# Patient Record
Sex: FEMALE | Race: WHITE | NOT HISPANIC OR LATINO
[De-identification: names, ages, dates, MRNs, and addresses within clinical notes are randomized per-mention and may not be internally consistent; named-entity substitution may affect disease eponyms.]

---

## 2017-12-19 ENCOUNTER — APPOINTMENT (OUTPATIENT)
Dept: GASTROENTEROLOGY | Facility: CLINIC | Age: 37
End: 2017-12-19
Payer: COMMERCIAL

## 2017-12-19 VITALS
SYSTOLIC BLOOD PRESSURE: 100 MMHG | WEIGHT: 127 LBS | BODY MASS INDEX: 20.41 KG/M2 | HEIGHT: 66 IN | DIASTOLIC BLOOD PRESSURE: 70 MMHG | TEMPERATURE: 98 F

## 2017-12-19 DIAGNOSIS — Z78.9 OTHER SPECIFIED HEALTH STATUS: ICD-10-CM

## 2017-12-19 DIAGNOSIS — Z87.19 PERSONAL HISTORY OF OTHER DISEASES OF THE DIGESTIVE SYSTEM: ICD-10-CM

## 2017-12-19 DIAGNOSIS — F15.90 OTHER STIMULANT USE, UNSPECIFIED, UNCOMPLICATED: ICD-10-CM

## 2017-12-19 DIAGNOSIS — Z83.79 FAMILY HISTORY OF OTHER DISEASES OF THE DIGESTIVE SYSTEM: ICD-10-CM

## 2017-12-19 PROCEDURE — 99244 OFF/OP CNSLTJ NEW/EST MOD 40: CPT

## 2017-12-19 RX ORDER — MULTIVITAMIN
TABLET ORAL DAILY
Refills: 0 | Status: ACTIVE | COMMUNITY

## 2018-06-28 ENCOUNTER — APPOINTMENT (OUTPATIENT)
Dept: OBGYN | Facility: CLINIC | Age: 38
End: 2018-06-28
Payer: COMMERCIAL

## 2018-06-28 VITALS
HEIGHT: 66 IN | SYSTOLIC BLOOD PRESSURE: 110 MMHG | WEIGHT: 127 LBS | DIASTOLIC BLOOD PRESSURE: 70 MMHG | BODY MASS INDEX: 20.41 KG/M2

## 2018-06-28 DIAGNOSIS — Z87.898 PERSONAL HISTORY OF OTHER SPECIFIED CONDITIONS: ICD-10-CM

## 2018-06-28 PROCEDURE — 99385 PREV VISIT NEW AGE 18-39: CPT

## 2018-06-29 LAB — HPV HIGH+LOW RISK DNA PNL CVX: NOT DETECTED

## 2018-07-02 LAB — CYTOLOGY CVX/VAG DOC THIN PREP: NORMAL

## 2018-08-24 ENCOUNTER — APPOINTMENT (OUTPATIENT)
Dept: INTERNAL MEDICINE | Facility: CLINIC | Age: 38
End: 2018-08-24
Payer: COMMERCIAL

## 2018-08-24 VITALS
DIASTOLIC BLOOD PRESSURE: 66 MMHG | OXYGEN SATURATION: 98 % | HEART RATE: 77 BPM | HEIGHT: 66 IN | WEIGHT: 131 LBS | SYSTOLIC BLOOD PRESSURE: 100 MMHG | BODY MASS INDEX: 21.05 KG/M2

## 2018-08-24 DIAGNOSIS — Z83.49 FAMILY HISTORY OF OTHER ENDOCRINE, NUTRITIONAL AND METABOLIC DISEASES: ICD-10-CM

## 2018-08-24 DIAGNOSIS — Z80.0 FAMILY HISTORY OF MALIGNANT NEOPLASM OF DIGESTIVE ORGANS: ICD-10-CM

## 2018-08-24 DIAGNOSIS — Z01.419 ENCOUNTER FOR GYNECOLOGICAL EXAMINATION (GENERAL) (ROUTINE) W/OUT ABNORMAL FINDINGS: ICD-10-CM

## 2018-08-24 DIAGNOSIS — Z83.71 FAMILY HISTORY OF COLONIC POLYPS: ICD-10-CM

## 2018-08-24 DIAGNOSIS — Z87.891 PERSONAL HISTORY OF NICOTINE DEPENDENCE: ICD-10-CM

## 2018-08-24 DIAGNOSIS — K62.89 OTHER SPECIFIED DISEASES OF ANUS AND RECTUM: ICD-10-CM

## 2018-08-24 DIAGNOSIS — Z87.42 PERSONAL HISTORY OF OTHER DISEASES OF THE FEMALE GENITAL TRACT: ICD-10-CM

## 2018-08-24 DIAGNOSIS — Z87.19 PERSONAL HISTORY OF OTHER DISEASES OF THE DIGESTIVE SYSTEM: ICD-10-CM

## 2018-08-24 DIAGNOSIS — Z83.79 FAMILY HISTORY OF OTHER DISEASES OF THE DIGESTIVE SYSTEM: ICD-10-CM

## 2018-08-24 PROCEDURE — 99395 PREV VISIT EST AGE 18-39: CPT | Mod: 25

## 2018-08-24 PROCEDURE — 90471 IMMUNIZATION ADMIN: CPT

## 2018-08-24 PROCEDURE — 90715 TDAP VACCINE 7 YRS/> IM: CPT

## 2018-08-24 NOTE — PAST MEDICAL HISTORY
[Menstruating] : menstruating [Normal Amount/Duration] : it was of a normal amount and duration [Regular Cycle Intervals] : have been regular [Total Preg ___] : G[unfilled] [Full Term ___] : Full Term: [unfilled]

## 2018-09-11 ENCOUNTER — TRANSCRIPTION ENCOUNTER (OUTPATIENT)
Age: 38
End: 2018-09-11

## 2018-09-14 LAB
25(OH)D3 SERPL-MCNC: 38.8 NG/ML
ALBUMIN SERPL ELPH-MCNC: 4.6 G/DL
ALP BLD-CCNC: 39 U/L
ALT SERPL-CCNC: 18 U/L
ANION GAP SERPL CALC-SCNC: 14 MMOL/L
AST SERPL-CCNC: 18 U/L
BASOPHILS # BLD AUTO: 0.01 K/UL
BASOPHILS NFR BLD AUTO: 0.3 %
BILIRUB SERPL-MCNC: 0.8 MG/DL
BUN SERPL-MCNC: 14 MG/DL
CALCIUM SERPL-MCNC: 9.4 MG/DL
CHLORIDE SERPL-SCNC: 103 MMOL/L
CHOLEST SERPL-MCNC: 184 MG/DL
CHOLEST/HDLC SERPL: 2.4 RATIO
CO2 SERPL-SCNC: 24 MMOL/L
CREAT SERPL-MCNC: 0.72 MG/DL
EOSINOPHIL # BLD AUTO: 0.02 K/UL
EOSINOPHIL NFR BLD AUTO: 0.6 %
GLUCOSE SERPL-MCNC: 84 MG/DL
HCT VFR BLD CALC: 39.4 %
HDLC SERPL-MCNC: 78 MG/DL
HGB BLD-MCNC: 12.3 G/DL
IMM GRANULOCYTES NFR BLD AUTO: 0 %
LDLC SERPL CALC-MCNC: 91 MG/DL
LYMPHOCYTES # BLD AUTO: 1.18 K/UL
LYMPHOCYTES NFR BLD AUTO: 33.1 %
MAN DIFF?: NORMAL
MCHC RBC-ENTMCNC: 29.3 PG
MCHC RBC-ENTMCNC: 31.2 GM/DL
MCV RBC AUTO: 93.8 FL
MONOCYTES # BLD AUTO: 0.29 K/UL
MONOCYTES NFR BLD AUTO: 8.1 %
NEUTROPHILS # BLD AUTO: 2.06 K/UL
NEUTROPHILS NFR BLD AUTO: 57.9 %
PLATELET # BLD AUTO: 199 K/UL
POTASSIUM SERPL-SCNC: 4.6 MMOL/L
PROT SERPL-MCNC: 7.9 G/DL
RBC # BLD: 4.2 M/UL
RBC # FLD: 13.8 %
SODIUM SERPL-SCNC: 141 MMOL/L
TRIGL SERPL-MCNC: 74 MG/DL
TSH SERPL-ACNC: 2.28 UIU/ML
WBC # FLD AUTO: 3.56 K/UL

## 2019-02-04 NOTE — ADDENDUM
[FreeTextEntry1] : 9/14/18:  Reviewed labs, nl except WBC 3.5 low, would repeat in few months, other labs nl - discussed with pt.\par 2/1/19:  Repeat CBC nl - LM to call back to discuss.\par 2/4/19:  Discussed above with pt.

## 2019-02-04 NOTE — ASSESSMENT
[FreeTextEntry1] : 39 yo female with h/o as above including alopecia areata here as a new patient to establish care.\par 1.  Endo - check TSH given fam hx and personal hx. alopecia areata\par 2.  GI - consider earlier colonoscopy than age 52 given fam hx colon polyps\par 3.  Gyn - pap utd, advised to discuss with gyn other contraception options\par 4.  HCM - check below labs; tdap today\par 5. RTO prn or 1year

## 2019-02-04 NOTE — PHYSICAL EXAM
[No Acute Distress] : no acute distress [Well Nourished] : well nourished [Well Developed] : well developed [Well-Appearing] : well-appearing [PERRL] : pupils equal round and reactive to light [Normal Oropharynx] : the oropharynx was normal [Normal TMs] : both tympanic membranes were normal [Normal Nasal Mucosa] : the nasal mucosa was normal [Supple] : supple [No Lymphadenopathy] : no lymphadenopathy [Thyroid Normal, No Nodules] : the thyroid was normal and there were no nodules present [No Respiratory Distress] : no respiratory distress  [Clear to Auscultation] : lungs were clear to auscultation bilaterally [No Accessory Muscle Use] : no accessory muscle use [Normal Rate] : normal rate  [Regular Rhythm] : with a regular rhythm [Normal S1, S2] : normal S1 and S2 [No Murmur] : no murmur heard [No Carotid Bruits] : no carotid bruits [Pedal Pulses Present] : the pedal pulses are present [No Edema] : there was no peripheral edema [Normal Appearance] : normal in appearance [No Nipple Discharge] : no nipple discharge [No Axillary Lymphadenopathy] : no axillary lymphadenopathy [Soft] : abdomen soft [Non Tender] : non-tender [Non-distended] : non-distended [No Masses] : no abdominal mass palpated [No HSM] : no HSM [Normal Bowel Sounds] : normal bowel sounds [Normal Supraclavicular Nodes] : no supraclavicular lymphadenopathy [Normal Axillary Nodes] : no axillary lymphadenopathy [Normal Posterior Cervical Nodes] : no posterior cervical lymphadenopathy [Normal Anterior Cervical Nodes] : no anterior cervical lymphadenopathy [Normal Inguinal Nodes] : no inguinal lymphadenopathy [No Joint Swelling] : no joint swelling [No Rash] : no rash [Normal Gait] : normal gait [Normal Affect] : the affect was normal [de-identified] : dense breast tissue

## 2019-02-04 NOTE — HISTORY OF PRESENT ILLNESS
[FreeTextEntry1] : establish care [de-identified] : 37 yo female with h/o as below here for CPE as a new patient.  Previous PCP Dr. Hairston in [facility] but seeing various doctors.  \par Did have alopecia, saw derm, also was dent in head, now better, got cortisone shots, wanted to make sure that there wasn't anything else going on.    \par Was also seeing breast specialist, saw spot right breast and had sonograms and said looks like nothing concerning, being followed.\par No other active issues.

## 2019-02-04 NOTE — REVIEW OF SYSTEMS
[Easy Bruising] : easy bruising [Negative] : Integumentary [Fever] : no fever [Chills] : no chills [Fatigue] : no fatigue [Recent Change In Weight] : ~T no recent weight change [Chest Pain] : no chest pain [Palpitations] : no palpitations [Shortness Of Breath] : no shortness of breath [Cough] : no cough [Abdominal Pain] : no abdominal pain [Nausea] : no nausea [Constipation] : no constipation [Diarrhea] : diarrhea [Vomiting] : no vomiting [Heartburn] : no heartburn [Melena] : no melena [Dysuria] : no dysuria [Vaginal Discharge] : no vaginal discharge [Dizziness] : no dizziness [Fainting] : no fainting [Anxiety] : no anxiety [Depression] : no depression [Easy Bleeding] : no easy bleeding [Swollen Glands] : no swollen glands [FreeTextEntry5] : palpitations with anxiety, rare [de-identified] : see hpi [de-identified] : was anemic during pregnancy; always bruised easily

## 2019-02-04 NOTE — HEALTH RISK ASSESSMENT
[0] : 2) Feeling down, depressed, or hopeless: Not at all (0) [Patient reported PAP Smear was normal] : Patient reported PAP Smear was normal [MammogramComments] : will go, never had [PapSmearDate] : 06/18 [ColonoscopyComments] : never had

## 2019-05-08 ENCOUNTER — TRANSCRIPTION ENCOUNTER (OUTPATIENT)
Age: 39
End: 2019-05-08

## 2019-06-14 ENCOUNTER — APPOINTMENT (OUTPATIENT)
Dept: INTERNAL MEDICINE | Facility: CLINIC | Age: 39
End: 2019-06-14
Payer: COMMERCIAL

## 2019-06-14 VITALS
WEIGHT: 128 LBS | HEART RATE: 70 BPM | BODY MASS INDEX: 20.57 KG/M2 | HEIGHT: 66 IN | DIASTOLIC BLOOD PRESSURE: 70 MMHG | OXYGEN SATURATION: 99 % | SYSTOLIC BLOOD PRESSURE: 100 MMHG

## 2019-06-14 DIAGNOSIS — D72.819 DECREASED WHITE BLOOD CELL COUNT, UNSPECIFIED: ICD-10-CM

## 2019-06-14 DIAGNOSIS — Z87.440 PERSONAL HISTORY OF URINARY (TRACT) INFECTIONS: ICD-10-CM

## 2019-06-14 PROCEDURE — 99395 PREV VISIT EST AGE 18-39: CPT

## 2019-06-14 RX ORDER — CIPROFLOXACIN HYDROCHLORIDE 250 MG/1
250 TABLET, FILM COATED ORAL
Qty: 6 | Refills: 0 | Status: DISCONTINUED | COMMUNITY
Start: 2018-09-18 | End: 2019-06-14

## 2019-06-14 RX ORDER — BIOTIN 10 MG
TABLET ORAL
Refills: 0 | Status: ACTIVE | COMMUNITY

## 2019-07-15 LAB
25(OH)D3 SERPL-MCNC: 42.1 NG/ML
ALBUMIN SERPL ELPH-MCNC: 4.9 G/DL
ALP BLD-CCNC: 34 U/L
ALT SERPL-CCNC: 11 U/L
ANION GAP SERPL CALC-SCNC: 13 MMOL/L
AST SERPL-CCNC: 15 U/L
BASOPHILS # BLD AUTO: 0.02 K/UL
BASOPHILS NFR BLD AUTO: 0.5 %
BILIRUB SERPL-MCNC: 1.4 MG/DL
BUN SERPL-MCNC: 14 MG/DL
CALCIUM SERPL-MCNC: 9.5 MG/DL
CHLORIDE SERPL-SCNC: 103 MMOL/L
CO2 SERPL-SCNC: 24 MMOL/L
CREAT SERPL-MCNC: 0.76 MG/DL
EOSINOPHIL # BLD AUTO: 0.03 K/UL
EOSINOPHIL NFR BLD AUTO: 0.8 %
GLUCOSE SERPL-MCNC: 91 MG/DL
HCT VFR BLD CALC: 37.3 %
HGB BLD-MCNC: 11.8 G/DL
IMM GRANULOCYTES NFR BLD AUTO: 0 %
LYMPHOCYTES # BLD AUTO: 1.32 K/UL
LYMPHOCYTES NFR BLD AUTO: 36.3 %
MAN DIFF?: NORMAL
MCHC RBC-ENTMCNC: 29.4 PG
MCHC RBC-ENTMCNC: 31.6 GM/DL
MCV RBC AUTO: 92.8 FL
MONOCYTES # BLD AUTO: 0.45 K/UL
MONOCYTES NFR BLD AUTO: 12.4 %
NEUTROPHILS # BLD AUTO: 1.82 K/UL
NEUTROPHILS NFR BLD AUTO: 50 %
PLATELET # BLD AUTO: 148 K/UL
POTASSIUM SERPL-SCNC: 4.8 MMOL/L
PROT SERPL-MCNC: 7.5 G/DL
RBC # BLD: 4.02 M/UL
RBC # FLD: 13.2 %
SODIUM SERPL-SCNC: 140 MMOL/L
TSH SERPL-ACNC: 2.72 UIU/ML
WBC # FLD AUTO: 3.64 K/UL

## 2019-07-15 NOTE — ADDENDUM
[FreeTextEntry1] : 7/3/19:  Reviewed labs, slightly low WBC and platelets (would repeat, was low previously), bilirubin 1.4 slightly elevated (would repeat), other labs nl, thyroid sono showing signs of Hashimoto's but nl size and no nodules - LM to call back to discuss.\par 7/15/19:  Reviewed labs with pt, will repeat CBC and bilirubin in 6 months, consider yearly TFT testing given thyroid sono results.

## 2019-07-15 NOTE — HISTORY OF PRESENT ILLNESS
[FreeTextEntry1] : physical [de-identified] : 40 yo female with h/o as below here for CPE.\par Feeling well overall, no complaints.\par Occasionally when goes to bathroom will have a little bit of blood with wiping, blood not coating the stool, had hemorrhoids in the past, had ?sigmoidoscopy in the past and everything was nl.\par Fell down stairs yesterday, fell down 4 steps, bruised buttock and arms, has chronic easy bruising, bleeding not excessive.  Didn't hit head.\par No other active issues.

## 2019-07-15 NOTE — REVIEW OF SYSTEMS
[Easy Bruising] : easy bruising [Negative] : Musculoskeletal [Fever] : no fever [Chills] : no chills [Fatigue] : no fatigue [Recent Change In Weight] : ~T no recent weight change [Chest Pain] : no chest pain [Palpitations] : no palpitations [Shortness Of Breath] : no shortness of breath [Cough] : no cough [Abdominal Pain] : no abdominal pain [Nausea] : no nausea [Constipation] : no constipation [Diarrhea] : diarrhea [Vomiting] : no vomiting [Heartburn] : no heartburn [Melena] : no melena [Dysuria] : no dysuria [Vaginal Discharge] : no vaginal discharge [Skin Rash] : no skin rash [Dizziness] : no dizziness [Fainting] : no fainting [Anxiety] : no anxiety [Depression] : no depression [Easy Bleeding] : no easy bleeding [Swollen Glands] : no swollen glands [FreeTextEntry7] : see hpi

## 2019-07-15 NOTE — HEALTH RISK ASSESSMENT
[0] : 2) Feeling down, depressed, or hopeless: Not at all (0) [Patient reported mammogram was normal] : Patient reported mammogram was normal [Patient reported PAP Smear was normal] : Patient reported PAP Smear was normal [MammogramDate] : 11/18 [MammogramComments] : ?spot breast, followed by ultrasound and breast specialist [PapSmearDate] : 06/19 [ColonoscopyComments] : not yet

## 2019-07-15 NOTE — ASSESSMENT
[FreeTextEntry1] : 40 yo female with h/o as above including alopecia areata here for CPE.\par 1.  Endo - ?thyromegaly or mild nodularity, check TSH and thyroid sono; check vitamin D, had nl lipids previously\par 2.  GI - hemorrhoid likely cause of slight blood with BM, will observe, advised stool softeners/fiber\par 3.  Gyn - pap utd\par 4.  HCM- check below labs; vaccines utd\par 5.  RTO prn or 1 year

## 2019-07-15 NOTE — PHYSICAL EXAM
[No Acute Distress] : no acute distress [Well Nourished] : well nourished [Well Developed] : well developed [Well-Appearing] : well-appearing [PERRL] : pupils equal round and reactive to light [Normal Oropharynx] : the oropharynx was normal [Normal TMs] : both tympanic membranes were normal [Normal Nasal Mucosa] : the nasal mucosa was normal [Supple] : supple [No Lymphadenopathy] : no lymphadenopathy [No Respiratory Distress] : no respiratory distress  [Clear to Auscultation] : lungs were clear to auscultation bilaterally [No Accessory Muscle Use] : no accessory muscle use [Normal Rate] : normal rate  [Regular Rhythm] : with a regular rhythm [Normal S1, S2] : normal S1 and S2 [No Murmur] : no murmur heard [No Carotid Bruits] : no carotid bruits [Pedal Pulses Present] : the pedal pulses are present [No Edema] : there was no peripheral edema [Normal Appearance] : normal in appearance [No Nipple Discharge] : no nipple discharge [No Axillary Lymphadenopathy] : no axillary lymphadenopathy [Soft] : abdomen soft [Non Tender] : non-tender [Non-distended] : non-distended [No Masses] : no abdominal mass palpated [No HSM] : no HSM [Normal Bowel Sounds] : normal bowel sounds [Normal Supraclavicular Nodes] : no supraclavicular lymphadenopathy [Normal Axillary Nodes] : no axillary lymphadenopathy [Normal Posterior Cervical Nodes] : no posterior cervical lymphadenopathy [Normal Anterior Cervical Nodes] : no anterior cervical lymphadenopathy [Normal Inguinal Nodes] : no inguinal lymphadenopathy [No Joint Swelling] : no joint swelling [No Rash] : no rash [Normal Gait] : normal gait [Normal Affect] : the affect was normal [de-identified] : ?thyromegaly [FreeTextEntry1] : external hemorrhoid [de-identified] : few fleshy papules on back; superficial ecchymosis left buttock

## 2019-08-30 ENCOUNTER — APPOINTMENT (OUTPATIENT)
Dept: INTERNAL MEDICINE | Facility: CLINIC | Age: 39
End: 2019-08-30

## 2019-09-23 ENCOUNTER — TRANSCRIPTION ENCOUNTER (OUTPATIENT)
Age: 39
End: 2019-09-23

## 2019-09-24 ENCOUNTER — APPOINTMENT (OUTPATIENT)
Dept: CT IMAGING | Facility: CLINIC | Age: 39
End: 2019-09-24

## 2019-09-24 ENCOUNTER — APPOINTMENT (OUTPATIENT)
Dept: INTERNAL MEDICINE | Facility: CLINIC | Age: 39
End: 2019-09-24
Payer: COMMERCIAL

## 2019-09-24 VITALS
SYSTOLIC BLOOD PRESSURE: 110 MMHG | DIASTOLIC BLOOD PRESSURE: 70 MMHG | HEIGHT: 66 IN | BODY MASS INDEX: 20.73 KG/M2 | WEIGHT: 129 LBS | TEMPERATURE: 98.2 F | HEART RATE: 81 BPM | OXYGEN SATURATION: 99 %

## 2019-09-24 LAB
BILIRUB UR QL STRIP: NEGATIVE
CLARITY UR: CLEAR
GLUCOSE UR-MCNC: NEGATIVE
HCG UR QL: 0.2 EU/DL
HCG UR QL: NEGATIVE
HGB UR QL STRIP.AUTO: NEGATIVE
KETONES UR-MCNC: NEGATIVE
LEUKOCYTE ESTERASE UR QL STRIP: NEGATIVE
NITRITE UR QL STRIP: NEGATIVE
PH UR STRIP: 7
PROT UR STRIP-MCNC: NEGATIVE
QUALITY CONTROL: YES
SP GR UR STRIP: 1.01

## 2019-09-24 PROCEDURE — 81025 URINE PREGNANCY TEST: CPT

## 2019-09-24 PROCEDURE — 99214 OFFICE O/P EST MOD 30 MIN: CPT | Mod: 25

## 2019-09-24 PROCEDURE — 81003 URINALYSIS AUTO W/O SCOPE: CPT | Mod: QW

## 2019-09-25 ENCOUNTER — TRANSCRIPTION ENCOUNTER (OUTPATIENT)
Age: 39
End: 2019-09-25

## 2019-09-25 LAB
APPEARANCE: CLEAR
BACTERIA UR CULT: NORMAL
BACTERIA: NEGATIVE
BILIRUBIN URINE: NEGATIVE
BLOOD URINE: NEGATIVE
COLOR: COLORLESS
GLUCOSE QUALITATIVE U: NEGATIVE
HYALINE CASTS: 0 /LPF
KETONES URINE: NEGATIVE
LEUKOCYTE ESTERASE URINE: NEGATIVE
MICROSCOPIC-UA: NORMAL
NITRITE URINE: NEGATIVE
PH URINE: 7
PROTEIN URINE: NEGATIVE
RED BLOOD CELLS URINE: 1 /HPF
SPECIFIC GRAVITY URINE: 1.01
SQUAMOUS EPITHELIAL CELLS: 1 /HPF
UROBILINOGEN URINE: NORMAL
WHITE BLOOD CELLS URINE: 0 /HPF

## 2019-09-25 NOTE — REVIEW OF SYSTEMS
[Negative] : Heme/Lymph [FreeTextEntry2] : see hpi [FreeTextEntry7] : see hpi [FreeTextEntry9] : see hpi

## 2019-09-25 NOTE — PHYSICAL EXAM
[No Acute Distress] : no acute distress [Well Nourished] : well nourished [Well Developed] : well developed [Well-Appearing] : well-appearing [Supple] : supple [No Respiratory Distress] : no respiratory distress  [No Accessory Muscle Use] : no accessory muscle use [Clear to Auscultation] : lungs were clear to auscultation bilaterally [Normal Rate] : normal rate  [Regular Rhythm] : with a regular rhythm [Normal S1, S2] : normal S1 and S2 [No Murmur] : no murmur heard [Soft] : abdomen soft [No Masses] : no abdominal mass palpated [Non-distended] : non-distended [No HSM] : no HSM [Normal Bowel Sounds] : normal bowel sounds [No Spinal Tenderness] : no spinal tenderness [de-identified] : mild-moderate LLQ tenderness to palpation, no rebound, no guarding [de-identified] : +CVA tenderness b/l, no muscle spasms, no tenderness to palpation upper back or lower back (but notes discomfort medial to scapula and in sacroiliac area b/l)

## 2019-09-25 NOTE — ADDENDUM
[FreeTextEntry1] : 9/24/19:  Called pt re. prelim read on CT A/P and discussed with pt, no pyelonephritis or diverticulitis found, possible complex left ovarian cyst (advised pelvic sono - advised pt discuss with gyn), cavernous hemangioma liver (per radiologist doesn't require further imaging), no other abnl.  \par 9/25/19:  UA/UCx negative - messaged to pt.

## 2019-09-25 NOTE — ASSESSMENT
[FreeTextEntry1] : 40 yo female with h/o as above including alopecia areata here for 3 weeks of mid-back pain/flank pain, worsening over time, with nausea (waking her from sleep), episode of chills and with flank tenderness and LLQ tenderness.  Negative UA and upregnancy in office, will send out UCx to r/o occult infection, will send for CT A/P today to r/o pyelonephritis or diverticulitis.

## 2020-07-16 ENCOUNTER — TRANSCRIPTION ENCOUNTER (OUTPATIENT)
Age: 40
End: 2020-07-16

## 2020-09-29 NOTE — ADDENDUM
[FreeTextEntry1] : Called pt to discuss, having intermittent dry cough, advised mucinex, gave ventolin in case needed, advised proning, fluids.  Discussed quarantine measures for herself and her family.  Will continue to monitor.

## 2020-10-01 NOTE — HISTORY OF PRESENT ILLNESS
[FreeTextEntry1] : Called pt to check in, pt feeling better, cough is better, fatigue better, almost back to herself.  Approximately day 10 of symptoms.  Advised to continue to monitor and quarantine and call back if any worsening.

## 2020-11-20 ENCOUNTER — APPOINTMENT (OUTPATIENT)
Dept: INTERNAL MEDICINE | Facility: CLINIC | Age: 40
End: 2020-11-20
Payer: COMMERCIAL

## 2020-11-20 VITALS
OXYGEN SATURATION: 99 % | HEART RATE: 75 BPM | BODY MASS INDEX: 21.21 KG/M2 | DIASTOLIC BLOOD PRESSURE: 60 MMHG | WEIGHT: 132 LBS | SYSTOLIC BLOOD PRESSURE: 90 MMHG | HEIGHT: 66 IN

## 2020-11-20 DIAGNOSIS — M54.9 DORSALGIA, UNSPECIFIED: ICD-10-CM

## 2020-11-20 DIAGNOSIS — Z87.898 PERSONAL HISTORY OF OTHER SPECIFIED CONDITIONS: ICD-10-CM

## 2020-11-20 PROCEDURE — 90686 IIV4 VACC NO PRSV 0.5 ML IM: CPT

## 2020-11-20 PROCEDURE — G0008: CPT

## 2020-11-20 PROCEDURE — 99395 PREV VISIT EST AGE 18-39: CPT | Mod: 25

## 2020-11-20 RX ORDER — ALBUTEROL SULFATE 90 UG/1
108 (90 BASE) INHALANT RESPIRATORY (INHALATION)
Qty: 1 | Refills: 3 | Status: DISCONTINUED | COMMUNITY
Start: 2020-09-29 | End: 2020-11-20

## 2020-11-22 NOTE — PHYSICAL EXAM
[No Acute Distress] : no acute distress [Well Nourished] : well nourished [Well Developed] : well developed [Well-Appearing] : well-appearing [PERRL] : pupils equal round and reactive to light [Normal TMs] : both tympanic membranes were normal [No Lymphadenopathy] : no lymphadenopathy [Supple] : supple [Thyroid Normal, No Nodules] : the thyroid was normal and there were no nodules present [No Respiratory Distress] : no respiratory distress  [No Accessory Muscle Use] : no accessory muscle use [Clear to Auscultation] : lungs were clear to auscultation bilaterally [Normal Rate] : normal rate  [Regular Rhythm] : with a regular rhythm [Normal S1, S2] : normal S1 and S2 [No Murmur] : no murmur heard [No Carotid Bruits] : no carotid bruits [Pedal Pulses Present] : the pedal pulses are present [No Edema] : there was no peripheral edema [Soft] : abdomen soft [Non Tender] : non-tender [Non-distended] : non-distended [No Masses] : no abdominal mass palpated [No HSM] : no HSM [Normal Bowel Sounds] : normal bowel sounds [Normal Supraclavicular Nodes] : no supraclavicular lymphadenopathy [Normal Posterior Cervical Nodes] : no posterior cervical lymphadenopathy [Normal Anterior Cervical Nodes] : no anterior cervical lymphadenopathy [Normal Inguinal Nodes] : no inguinal lymphadenopathy [No Joint Swelling] : no joint swelling [No Rash] : no rash [Normal Gait] : normal gait [Normal Affect] : the affect was normal

## 2020-12-17 LAB
APPEARANCE: CLEAR
BACTERIA: NEGATIVE
BASOPHILS # BLD AUTO: 0.02 K/UL
BASOPHILS NFR BLD AUTO: 0.3 %
BILIRUBIN URINE: NEGATIVE
BLOOD URINE: NEGATIVE
COLOR: COLORLESS
EOSINOPHIL # BLD AUTO: 0.01 K/UL
EOSINOPHIL NFR BLD AUTO: 0.2 %
FERRITIN SERPL-MCNC: 17 NG/ML
GLUCOSE QUALITATIVE U: NEGATIVE
HCT VFR BLD CALC: 38.4 %
HGB BLD-MCNC: 12.4 G/DL
HYALINE CASTS: 1 /LPF
IMM GRANULOCYTES NFR BLD AUTO: 0.2 %
IRON SATN MFR SERPL: 36 %
IRON SERPL-MCNC: 126 UG/DL
KETONES URINE: NEGATIVE
LEUKOCYTE ESTERASE URINE: NEGATIVE
LYMPHOCYTES # BLD AUTO: 1.05 K/UL
LYMPHOCYTES NFR BLD AUTO: 18.1 %
MAN DIFF?: NORMAL
MCHC RBC-ENTMCNC: 30.6 PG
MCHC RBC-ENTMCNC: 32.3 GM/DL
MCV RBC AUTO: 94.8 FL
MICROSCOPIC-UA: NORMAL
MONOCYTES # BLD AUTO: 0.42 K/UL
MONOCYTES NFR BLD AUTO: 7.2 %
NEUTROPHILS # BLD AUTO: 4.3 K/UL
NEUTROPHILS NFR BLD AUTO: 74 %
NITRITE URINE: NEGATIVE
PH URINE: 6.5
PLATELET # BLD AUTO: 159 K/UL
PROTEIN URINE: NEGATIVE
RBC # BLD: 4.05 M/UL
RBC # FLD: 12.8 %
RED BLOOD CELLS URINE: 0 /HPF
SARS-COV-2 IGG SERPL IA-ACNC: 7.76 INDEX
SARS-COV-2 IGG SERPL QL IA: POSITIVE
SPECIFIC GRAVITY URINE: 1
SQUAMOUS EPITHELIAL CELLS: 1 /HPF
TIBC SERPL-MCNC: 352 UG/DL
TSH SERPL-ACNC: 2.89 UIU/ML
UIBC SERPL-MCNC: 226 UG/DL
UROBILINOGEN URINE: NORMAL
WBC # FLD AUTO: 5.81 K/UL
WHITE BLOOD CELLS URINE: 0 /HPF

## 2020-12-17 NOTE — ASSESSMENT
[FreeTextEntry1] : 41 yo female with h/o as above including alopecia areata here for CPE.\par 1.  Derm - hair loss different than her usual alopecia, check TSH and iron studies\par 2.  Heme - had slight low platelets, check cbc again\par 3.  Gyn - pap/mammo utd\par 4.  ID - recent covid, check ab and ua\par 5.  HCM - check below labs, other labs recently done/nl; flu shot today, other vaccines utd\par 6.  RTO prn or 1 year

## 2020-12-17 NOTE — REVIEW OF SYSTEMS
[Frequency] : frequency [Easy Bruising] : easy bruising [Negative] : Musculoskeletal [Fever] : no fever [Chills] : no chills [Fatigue] : no fatigue [Recent Change In Weight] : ~T no recent weight change [Chest Pain] : no chest pain [Palpitations] : no palpitations [Shortness Of Breath] : no shortness of breath [Cough] : no cough [Abdominal Pain] : no abdominal pain [Nausea] : no nausea [Constipation] : no constipation [Diarrhea] : diarrhea [Vomiting] : no vomiting [Heartburn] : no heartburn [Melena] : no melena [Dysuria] : no dysuria [Vaginal Discharge] : no vaginal discharge [Skin Rash] : no skin rash [Dizziness] : no dizziness [Fainting] : no fainting [Anxiety] : no anxiety [Depression] : no depression [Easy Bleeding] : no easy bleeding [Swollen Glands] : no swollen glands [FreeTextEntry2] : weight gain with covid/stress eating [FreeTextEntry8] : frequent urination [de-identified] : chronic easy bruising

## 2020-12-17 NOTE — HEALTH RISK ASSESSMENT
[0] : 2) Feeling down, depressed, or hopeless: Not at all (0) [MammogramComments] : gets mammo/breast sono [PapSmearComments] : utd [ColonoscopyComments] : hasn't had

## 2020-12-17 NOTE — HISTORY OF PRESENT ILLNESS
[FreeTextEntry1] : physical [de-identified] : 41 yo female with h/o as below here for CPE.\par Feeling well overall, no complaints.  Having a lot of hair loss.  No further symptoms from COVID few weeks ago, cough fully resolved.

## 2021-05-17 ENCOUNTER — NON-APPOINTMENT (OUTPATIENT)
Age: 41
End: 2021-05-17

## 2021-05-17 ENCOUNTER — APPOINTMENT (OUTPATIENT)
Dept: INTERNAL MEDICINE | Facility: CLINIC | Age: 41
End: 2021-05-17

## 2023-01-13 ENCOUNTER — APPOINTMENT (OUTPATIENT)
Dept: INTERNAL MEDICINE | Facility: CLINIC | Age: 43
End: 2023-01-13
Payer: COMMERCIAL

## 2023-01-13 VITALS
HEIGHT: 66 IN | BODY MASS INDEX: 20.25 KG/M2 | SYSTOLIC BLOOD PRESSURE: 120 MMHG | DIASTOLIC BLOOD PRESSURE: 80 MMHG | HEART RATE: 89 BPM | OXYGEN SATURATION: 98 % | WEIGHT: 126 LBS

## 2023-01-13 DIAGNOSIS — L63.9 ALOPECIA AREATA, UNSPECIFIED: ICD-10-CM

## 2023-01-13 DIAGNOSIS — Z86.16 PERSONAL HISTORY OF COVID-19: ICD-10-CM

## 2023-01-13 DIAGNOSIS — Z83.79 FAMILY HISTORY OF OTHER DISEASES OF THE DIGESTIVE SYSTEM: ICD-10-CM

## 2023-01-13 DIAGNOSIS — R20.2 PARESTHESIA OF SKIN: ICD-10-CM

## 2023-01-13 DIAGNOSIS — R94.6 ABNORMAL RESULTS OF THYROID FUNCTION STUDIES: ICD-10-CM

## 2023-01-13 DIAGNOSIS — L65.9 NONSCARRING HAIR LOSS, UNSPECIFIED: ICD-10-CM

## 2023-01-13 PROCEDURE — 99396 PREV VISIT EST AGE 40-64: CPT

## 2023-01-17 ENCOUNTER — TRANSCRIPTION ENCOUNTER (OUTPATIENT)
Age: 43
End: 2023-01-17

## 2023-01-17 LAB
ALBUMIN SERPL ELPH-MCNC: 4.7 G/DL
ALP BLD-CCNC: 39 U/L
ALT SERPL-CCNC: 10 U/L
ANION GAP SERPL CALC-SCNC: 9 MMOL/L
AST SERPL-CCNC: 12 U/L
BASOPHILS # BLD AUTO: 0.04 K/UL
BASOPHILS NFR BLD AUTO: 1 %
BILIRUB SERPL-MCNC: 0.8 MG/DL
BUN SERPL-MCNC: 12 MG/DL
CALCIUM SERPL-MCNC: 9.5 MG/DL
CHLORIDE SERPL-SCNC: 103 MMOL/L
CHOLEST SERPL-MCNC: 159 MG/DL
CO2 SERPL-SCNC: 27 MMOL/L
CREAT SERPL-MCNC: 0.76 MG/DL
EGFR: 102 ML/MIN/1.73M2
EOSINOPHIL # BLD AUTO: 0.03 K/UL
EOSINOPHIL NFR BLD AUTO: 0.8 %
FERRITIN SERPL-MCNC: 20 NG/ML
FOLATE SERPL-MCNC: >20 NG/ML
GLUCOSE SERPL-MCNC: 86 MG/DL
HCT VFR BLD CALC: 39 %
HDLC SERPL-MCNC: 67 MG/DL
HGB BLD-MCNC: 12.5 G/DL
IMM GRANULOCYTES NFR BLD AUTO: 0.3 %
IRON SATN MFR SERPL: 23 %
IRON SERPL-MCNC: 79 UG/DL
LDLC SERPL CALC-MCNC: 80 MG/DL
LYMPHOCYTES # BLD AUTO: 1.1 K/UL
LYMPHOCYTES NFR BLD AUTO: 28.7 %
MAN DIFF?: NORMAL
MCHC RBC-ENTMCNC: 30.1 PG
MCHC RBC-ENTMCNC: 32.1 GM/DL
MCV RBC AUTO: 94 FL
MONOCYTES # BLD AUTO: 0.36 K/UL
MONOCYTES NFR BLD AUTO: 9.4 %
NEUTROPHILS # BLD AUTO: 2.29 K/UL
NEUTROPHILS NFR BLD AUTO: 59.8 %
NONHDLC SERPL-MCNC: 92 MG/DL
PLATELET # BLD AUTO: 175 K/UL
POTASSIUM SERPL-SCNC: 4.6 MMOL/L
PROT SERPL-MCNC: 7.5 G/DL
RBC # BLD: 4.15 M/UL
RBC # FLD: 12.8 %
SODIUM SERPL-SCNC: 140 MMOL/L
TIBC SERPL-MCNC: 350 UG/DL
TRIGL SERPL-MCNC: 56 MG/DL
TSH SERPL-ACNC: 3.48 UIU/ML
UIBC SERPL-MCNC: 271 UG/DL
VIT B12 SERPL-MCNC: 999 PG/ML
WBC # FLD AUTO: 3.83 K/UL

## 2023-01-17 NOTE — REVIEW OF SYSTEMS
[Easy Bruising] : easy bruising [Negative] : Musculoskeletal [Fever] : no fever [Chills] : no chills [Fatigue] : no fatigue [Recent Change In Weight] : ~T no recent weight change [Chest Pain] : no chest pain [Palpitations] : no palpitations [Shortness Of Breath] : no shortness of breath [Cough] : no cough [Abdominal Pain] : no abdominal pain [Nausea] : no nausea [Constipation] : no constipation [Diarrhea] : diarrhea [Vomiting] : no vomiting [Heartburn] : no heartburn [Melena] : no melena [Dysuria] : no dysuria [Vaginal Discharge] : no vaginal discharge [Skin Rash] : no skin rash [Headache] : no headache [Dizziness] : no dizziness [Fainting] : no fainting [Anxiety] : no anxiety [Depression] : no depression [Easy Bleeding] : no easy bleeding [de-identified] : see hpi [de-identified] : see hpi [de-identified] : see hpi [de-identified] : chronic easy bruising

## 2023-01-17 NOTE — PHYSICAL EXAM
[No Acute Distress] : no acute distress [Well Nourished] : well nourished [Well Developed] : well developed [Well-Appearing] : well-appearing [PERRL] : pupils equal round and reactive to light [EOMI] : extraocular movements intact [Normal Oropharynx] : the oropharynx was normal [Normal TMs] : both tympanic membranes were normal [No Lymphadenopathy] : no lymphadenopathy [Supple] : supple [Thyroid Normal, No Nodules] : the thyroid was normal and there were no nodules present [No Respiratory Distress] : no respiratory distress  [No Accessory Muscle Use] : no accessory muscle use [Clear to Auscultation] : lungs were clear to auscultation bilaterally [Normal Rate] : normal rate  [Regular Rhythm] : with a regular rhythm [Normal S1, S2] : normal S1 and S2 [No Murmur] : no murmur heard [No Carotid Bruits] : no carotid bruits [Pedal Pulses Present] : the pedal pulses are present [No Edema] : there was no peripheral edema [Normal Appearance] : normal in appearance [No Nipple Discharge] : no nipple discharge [No Axillary Lymphadenopathy] : no axillary lymphadenopathy [Soft] : abdomen soft [Non Tender] : non-tender [Non-distended] : non-distended [No Masses] : no abdominal mass palpated [No HSM] : no HSM [Normal Bowel Sounds] : normal bowel sounds [Normal Supraclavicular Nodes] : no supraclavicular lymphadenopathy [Normal Axillary Nodes] : no axillary lymphadenopathy [Normal Posterior Cervical Nodes] : no posterior cervical lymphadenopathy [Normal Anterior Cervical Nodes] : no anterior cervical lymphadenopathy [Normal Inguinal Nodes] : no inguinal lymphadenopathy [No Joint Swelling] : no joint swelling [No Rash] : no rash [Normal Gait] : normal gait [Normal Affect] : the affect was normal [de-identified] : very dense and lumpy breast tissue b/l

## 2023-01-17 NOTE — ASSESSMENT
[FreeTextEntry1] : 41 yo female with h/o as above including alopecia areata here for CPE.\par 1.  Derm/hair - some increased hair loss, ?from stress, check tsh and iron studies, saw derm recently\par 2.  Psych - mild dysthymia likely due to life stress with daughter's condition, will try to find longitudinal behavioral health resources for pt\par 3.  Gyn - pap utd, mammo utd and follows with breast surgeon as well\par 4.  Neuro - some intermittent paresthesias in head ever since had covid vaccine, check vitamin b12/folate\par 5.  HCM - check below labs; suggested covid and flu vaccine but wants to defer given reaction to last covid vaccine (which flared again after had flu shot last year)\par 6.  RTO prn or 1 year

## 2023-01-17 NOTE — HEALTH RISK ASSESSMENT
[Patient reported mammogram was normal] : Patient reported mammogram was normal [Patient reported PAP Smear was normal] : Patient reported PAP Smear was normal [MammogramDate] : 07/22 [MammogramComments] : seeing breast specialist, gets mammo/breast sono [PapSmearDate] : 11/22 [PapSmearComments] : utd [ColonoscopyComments] : hasn't had

## 2023-01-17 NOTE — HISTORY OF PRESENT ILLNESS
[FreeTextEntry1] : physical [de-identified] : 43 yo female with h/o as below here for CPE.\par Daughter got diagnosed with ulcerative colitis, in and out of the hospital, going for iron transfusion today, age 9 now.  So a lot of stress, feels a little down due to the situation.\par Saw dermatologist recently.\par After had covid vaccine, 15 minutes after second shot, felt tingling/warming sensation in head, intermittent for 2 weeks in her head, then it went away, ever since had menses or ovulating would occur again, subsided over time, few weeks ago son had a URI, she got URI and then felt it again in head.  Not sure if due to some inflammatory process from covid vaccine.\par Had more hair loss.  Nl menses.\par No other active issues.

## 2023-02-03 ENCOUNTER — APPOINTMENT (OUTPATIENT)
Dept: INTERNAL MEDICINE | Facility: CLINIC | Age: 43
End: 2023-02-03
Payer: COMMERCIAL

## 2023-02-03 ENCOUNTER — NON-APPOINTMENT (OUTPATIENT)
Age: 43
End: 2023-02-03

## 2023-02-03 PROCEDURE — 90791 PSYCH DIAGNOSTIC EVALUATION: CPT | Mod: 95

## 2023-02-07 ENCOUNTER — APPOINTMENT (OUTPATIENT)
Dept: INTERNAL MEDICINE | Facility: CLINIC | Age: 43
End: 2023-02-07
Payer: COMMERCIAL

## 2023-02-07 PROCEDURE — 90834 PSYTX W PT 45 MINUTES: CPT | Mod: 95

## 2023-02-14 ENCOUNTER — TRANSCRIPTION ENCOUNTER (OUTPATIENT)
Age: 43
End: 2023-02-14

## 2023-02-28 ENCOUNTER — APPOINTMENT (OUTPATIENT)
Dept: INTERNAL MEDICINE | Facility: CLINIC | Age: 43
End: 2023-02-28
Payer: COMMERCIAL

## 2023-02-28 PROCEDURE — 90834 PSYTX W PT 45 MINUTES: CPT | Mod: 95

## 2023-03-09 ENCOUNTER — APPOINTMENT (OUTPATIENT)
Dept: INTERNAL MEDICINE | Facility: CLINIC | Age: 43
End: 2023-03-09
Payer: COMMERCIAL

## 2023-03-09 PROCEDURE — 99213 OFFICE O/P EST LOW 20 MIN: CPT | Mod: 95

## 2023-03-10 ENCOUNTER — APPOINTMENT (OUTPATIENT)
Dept: INTERNAL MEDICINE | Facility: CLINIC | Age: 43
End: 2023-03-10
Payer: COMMERCIAL

## 2023-03-10 PROCEDURE — 90834 PSYTX W PT 45 MINUTES: CPT | Mod: 95

## 2023-03-12 NOTE — PHYSICAL EXAM
[de-identified] : TEB. General: Well-developed well-nourished in no apparent distress. Appears mildly ill. \par Respiratory: Speaking in complete sentences, no respiratory distress noted.\par Neuro: No focal deficits noted.\par

## 2023-03-12 NOTE — ASSESSMENT
[FreeTextEntry1] : MARY JO SORIANO  is a 42 year old female  with history of  hair loss, paresthesia presented today for Rt big toe cuticle area pain and swelling. \par \par # Rt big toe cellulitis\par Sent Rx for Keflex 500mg qid for 7days. \par Explained that if abscess forms, she should see a provider in person for possible I & D. \par Patient was advised to call us for any worsening sx or if no resolution.

## 2023-03-12 NOTE — HISTORY OF PRESENT ILLNESS
[Home] : at home, [unfilled] , at the time of the visit. [Medical Office: ([facility])___] : at the medical office located in  [Verbal consent obtained from patient] : the patient, [unfilled] [FreeTextEntry8] : MARY JO SORIANO  is a 42 year old female  with history of  hair loss, paresthesia presented today for Rt big toe cuticle area pain and swelling. \par \par She has Rt big toe's cuticle swelling with throbbing severe pain for a few days.\par No hx ingrown toenail or fungal infection.\par Denied foot trauma or diabetes.\par She got pedicure about 3 weeks ago.\par She was wearing shoes  that pressed against toe last week for wedding.\par Denied fever, chills,CP, SOB, abdominal pain, n/v/c/d.

## 2023-03-17 ENCOUNTER — APPOINTMENT (OUTPATIENT)
Dept: INTERNAL MEDICINE | Facility: CLINIC | Age: 43
End: 2023-03-17
Payer: COMMERCIAL

## 2023-03-17 PROCEDURE — 90834 PSYTX W PT 45 MINUTES: CPT | Mod: 95

## 2023-03-24 ENCOUNTER — APPOINTMENT (OUTPATIENT)
Dept: INTERNAL MEDICINE | Facility: CLINIC | Age: 43
End: 2023-03-24
Payer: COMMERCIAL

## 2023-04-04 ENCOUNTER — APPOINTMENT (OUTPATIENT)
Dept: INTERNAL MEDICINE | Facility: CLINIC | Age: 43
End: 2023-04-04
Payer: COMMERCIAL

## 2023-04-04 PROCEDURE — 90834 PSYTX W PT 45 MINUTES: CPT | Mod: 95

## 2023-04-11 ENCOUNTER — APPOINTMENT (OUTPATIENT)
Dept: INTERNAL MEDICINE | Facility: CLINIC | Age: 43
End: 2023-04-11

## 2023-05-09 ENCOUNTER — APPOINTMENT (OUTPATIENT)
Dept: INTERNAL MEDICINE | Facility: CLINIC | Age: 43
End: 2023-05-09
Payer: COMMERCIAL

## 2023-05-09 PROCEDURE — 90834 PSYTX W PT 45 MINUTES: CPT | Mod: 95

## 2023-05-19 ENCOUNTER — APPOINTMENT (OUTPATIENT)
Dept: INTERNAL MEDICINE | Facility: CLINIC | Age: 43
End: 2023-05-19
Payer: COMMERCIAL

## 2023-05-19 PROCEDURE — 90834 PSYTX W PT 45 MINUTES: CPT | Mod: 95

## 2023-05-30 ENCOUNTER — APPOINTMENT (OUTPATIENT)
Dept: INTERNAL MEDICINE | Facility: CLINIC | Age: 43
End: 2023-05-30
Payer: COMMERCIAL

## 2023-05-30 PROCEDURE — 90834 PSYTX W PT 45 MINUTES: CPT | Mod: 95

## 2023-06-09 ENCOUNTER — APPOINTMENT (OUTPATIENT)
Dept: INTERNAL MEDICINE | Facility: CLINIC | Age: 43
End: 2023-06-09

## 2023-06-16 ENCOUNTER — APPOINTMENT (OUTPATIENT)
Dept: INTERNAL MEDICINE | Facility: CLINIC | Age: 43
End: 2023-06-16
Payer: COMMERCIAL

## 2023-06-16 PROCEDURE — 90834 PSYTX W PT 45 MINUTES: CPT | Mod: 95

## 2023-06-23 ENCOUNTER — APPOINTMENT (OUTPATIENT)
Dept: INTERNAL MEDICINE | Facility: CLINIC | Age: 43
End: 2023-06-23
Payer: COMMERCIAL

## 2023-06-23 PROCEDURE — 90834 PSYTX W PT 45 MINUTES: CPT | Mod: 95

## 2024-02-08 PROBLEM — L03.039 CELLULITIS, TOE: Status: RESOLVED | Noted: 2023-03-09 | Resolved: 2024-02-08

## 2024-02-09 ENCOUNTER — APPOINTMENT (OUTPATIENT)
Dept: CT IMAGING | Facility: IMAGING CENTER | Age: 44
End: 2024-02-09
Payer: COMMERCIAL

## 2024-02-09 ENCOUNTER — OUTPATIENT (OUTPATIENT)
Dept: OUTPATIENT SERVICES | Facility: HOSPITAL | Age: 44
LOS: 1 days | End: 2024-02-09
Payer: COMMERCIAL

## 2024-02-09 ENCOUNTER — APPOINTMENT (OUTPATIENT)
Dept: INTERNAL MEDICINE | Facility: CLINIC | Age: 44
End: 2024-02-09
Payer: COMMERCIAL

## 2024-02-09 ENCOUNTER — RESULT REVIEW (OUTPATIENT)
Age: 44
End: 2024-02-09

## 2024-02-09 VITALS
SYSTOLIC BLOOD PRESSURE: 100 MMHG | OXYGEN SATURATION: 99 % | WEIGHT: 130 LBS | DIASTOLIC BLOOD PRESSURE: 74 MMHG | HEART RATE: 77 BPM | HEIGHT: 66 IN | BODY MASS INDEX: 20.89 KG/M2

## 2024-02-09 DIAGNOSIS — R10.32 LEFT LOWER QUADRANT PAIN: ICD-10-CM

## 2024-02-09 DIAGNOSIS — I10 ESSENTIAL (PRIMARY) HYPERTENSION: ICD-10-CM

## 2024-02-09 DIAGNOSIS — L03.039 CELLULITIS OF UNSPECIFIED TOE: ICD-10-CM

## 2024-02-09 DIAGNOSIS — Z23 ENCOUNTER FOR IMMUNIZATION: ICD-10-CM

## 2024-02-09 DIAGNOSIS — Z00.00 ENCOUNTER FOR GENERAL ADULT MEDICAL EXAMINATION W/OUT ABNORMAL FINDINGS: ICD-10-CM

## 2024-02-09 DIAGNOSIS — L03.031 CELLULITIS OF RIGHT TOE: ICD-10-CM

## 2024-02-09 PROCEDURE — 90686 IIV4 VACC NO PRSV 0.5 ML IM: CPT

## 2024-02-09 PROCEDURE — G0463: CPT

## 2024-02-09 PROCEDURE — G0008: CPT

## 2024-02-09 PROCEDURE — 74177 CT ABD & PELVIS W/CONTRAST: CPT | Mod: 26

## 2024-02-09 PROCEDURE — 99396 PREV VISIT EST AGE 40-64: CPT

## 2024-02-09 PROCEDURE — 74177 CT ABD & PELVIS W/CONTRAST: CPT

## 2024-02-09 RX ORDER — CEPHALEXIN 500 MG/1
500 TABLET ORAL
Qty: 28 | Refills: 0 | Status: DISCONTINUED | COMMUNITY
Start: 2023-03-09 | End: 2024-02-09

## 2024-02-09 RX ORDER — PSYLLIUM HUSK 0.4 G
CAPSULE ORAL
Refills: 0 | Status: ACTIVE | COMMUNITY

## 2024-02-09 RX ORDER — GUAR GUM 1 G
TABLET,CHEWABLE ORAL
Refills: 0 | Status: DISCONTINUED | COMMUNITY
End: 2024-02-09

## 2024-02-22 DIAGNOSIS — R10.32 LEFT LOWER QUADRANT PAIN: ICD-10-CM

## 2024-02-22 DIAGNOSIS — Z23 ENCOUNTER FOR IMMUNIZATION: ICD-10-CM

## 2024-02-22 DIAGNOSIS — Z00.00 ENCOUNTER FOR GENERAL ADULT MEDICAL EXAMINATION WITHOUT ABNORMAL FINDINGS: ICD-10-CM

## 2024-04-29 DIAGNOSIS — R93.2 ABNORMAL FINDINGS ON DIAGNOSTIC IMAGING OF LIVER AND BILIARY TRACT: ICD-10-CM

## 2024-04-29 LAB
ALBUMIN SERPL ELPH-MCNC: 4.9 G/DL
ALP BLD-CCNC: 36 U/L
ALT SERPL-CCNC: 10 U/L
ANION GAP SERPL CALC-SCNC: 9 MMOL/L
AST SERPL-CCNC: 16 U/L
BILIRUB SERPL-MCNC: 1 MG/DL
BUN SERPL-MCNC: 14 MG/DL
CALCIUM SERPL-MCNC: 9.4 MG/DL
CHLORIDE SERPL-SCNC: 103 MMOL/L
CHOLEST SERPL-MCNC: 143 MG/DL
CO2 SERPL-SCNC: 27 MMOL/L
CREAT SERPL-MCNC: 0.72 MG/DL
EGFR: 108 ML/MIN/1.73M2
GLUCOSE SERPL-MCNC: 88 MG/DL
HCT VFR BLD CALC: 36.6 %
HDLC SERPL-MCNC: 63 MG/DL
HGB BLD-MCNC: 12.1 G/DL
IRON SERPL-MCNC: 62 UG/DL
LDLC SERPL CALC-MCNC: 66 MG/DL
MCHC RBC-ENTMCNC: 29.9 PG
MCHC RBC-ENTMCNC: 33.1 GM/DL
MCV RBC AUTO: 90.4 FL
NONHDLC SERPL-MCNC: 79 MG/DL
PLATELET # BLD AUTO: 164 K/UL
POTASSIUM SERPL-SCNC: 4.5 MMOL/L
PROT SERPL-MCNC: 7.5 G/DL
RBC # BLD: 4.05 M/UL
RBC # FLD: 13 %
SODIUM SERPL-SCNC: 139 MMOL/L
TRIGL SERPL-MCNC: 64 MG/DL
TSH SERPL-ACNC: 3.29 UIU/ML
WBC # FLD AUTO: 4.47 K/UL

## 2024-04-29 NOTE — ADDENDUM
[FreeTextEntry1] : 2/13/24:  Reviewed labs/imaging with pt, nl labs, CT showed no abnl as cause of pain, found 5.9 cm liver lesion (?hemangioma) and smaller liver lesions (consider MRI abdomen, will order) and left ovarian cystic foci and possible uterine fibroid, can f/up with gyn.

## 2024-04-29 NOTE — HISTORY OF PRESENT ILLNESS
[FreeTextEntry1] : physical [de-identified] : 42 yo female with h/o as below here for CPE. Feeling ok, stress in general (daughter has UC, on 2 biologics), but doing better. This past week had pain LLQ but just saw gyn and had sono and didn't see anything.  Back was hurting her last few days lower back and then LLQ, was nauseated and then ate and felt better.   Having more calcium lately.  Not constipation, maybe having more frequent BMs twice/day, changed eating in last month, eating more yogurt and beans and healthier foods.   Still gets tingling in head if high stress week at work, not sure if inflammatory response as occurred after covid vaccine.   No other active issues.  Went to dermatologist, gynecologist.  Has dark spot one nail.  Asking about seeing GI for early colonoscopy given fam hx colon polyps and colon cancer.  Also screened positive for one gene on genetic testing that could cause colon polyps.

## 2024-04-29 NOTE — PHYSICAL EXAM
[No Acute Distress] : no acute distress [Well Nourished] : well nourished [Well Developed] : well developed [Well-Appearing] : well-appearing [PERRL] : pupils equal round and reactive to light [EOMI] : extraocular movements intact [Normal Oropharynx] : the oropharynx was normal [Normal TMs] : both tympanic membranes were normal [No Lymphadenopathy] : no lymphadenopathy [Supple] : supple [Thyroid Normal, No Nodules] : the thyroid was normal and there were no nodules present [No Respiratory Distress] : no respiratory distress  [No Accessory Muscle Use] : no accessory muscle use [Clear to Auscultation] : lungs were clear to auscultation bilaterally [Normal Rate] : normal rate  [Regular Rhythm] : with a regular rhythm [Normal S1, S2] : normal S1 and S2 [No Murmur] : no murmur heard [No Carotid Bruits] : no carotid bruits [Pedal Pulses Present] : the pedal pulses are present [No Edema] : there was no peripheral edema [Normal Appearance] : normal in appearance [No Nipple Discharge] : no nipple discharge [No Axillary Lymphadenopathy] : no axillary lymphadenopathy [Soft] : abdomen soft [Non-distended] : non-distended [No Masses] : no abdominal mass palpated [No HSM] : no HSM [Normal Bowel Sounds] : normal bowel sounds [Normal Supraclavicular Nodes] : no supraclavicular lymphadenopathy [Normal Posterior Cervical Nodes] : no posterior cervical lymphadenopathy [Normal Anterior Cervical Nodes] : no anterior cervical lymphadenopathy [Normal Inguinal Nodes] : no inguinal lymphadenopathy [No Joint Swelling] : no joint swelling [No Rash] : no rash [Normal Gait] : normal gait [Normal Affect] : the affect was normal [de-identified] : dense lumpy breast tissue b/l [de-identified] : + LLQ tenderness to palpation (and LLQ pain with suprapubic palpation as well) [de-identified] : scattered moles; discoloration small area right great toenail laterally

## 2024-04-29 NOTE — ASSESSMENT
[FreeTextEntry1] : 44 yo female with h/o as above including alopecia areata here for CPE. 1.  GI - intermittent LLQ pain last few weeks, now reporting slightly worse and more tender to palpation, with some intermittent nausea, will send for CT A/P to r/o diverticulitis; also referred to gi for colonoscopy given fam hx 2.  Gyn - pap and mammo utd, pt to send mammo report 3.  Derm - will f/up with derm for nail discoloration as well, ?fungal (derm to take nail sample) 4.  HCM - check below labs (also requesting iron and tsh given fam hx); flu shot today, declines covid given symptoms with prior shot, other vaccines utd 5.  RTO prn or 1 year

## 2024-04-29 NOTE — PAST MEDICAL HISTORY
[Menstruating] : menstruating [Normal Amount/Duration] : it was of a normal amount and duration [Regular Cycle Intervals] : have been regular [Total Preg ___] : G[unfilled] [Full Term ___] : Full Term: [unfilled] [FreeTextEntry1] : no uterine or ovarian abnl,  had vasectomy, no concerns for STIs

## 2024-04-29 NOTE — HEALTH RISK ASSESSMENT
[Patient reported mammogram was normal] : Patient reported mammogram was normal [Patient reported PAP Smear was normal] : Patient reported PAP Smear was normal [0] : 2) Feeling down, depressed, or hopeless: Not at all (0) [MammogramDate] : 05/23 [MammogramComments] : seeing breast specialist, gets mammo/breast sono [PapSmearDate] : 01/23 [PapSmearComments] : utd [ColonoscopyComments] : hasn't had

## 2024-04-29 NOTE — REVIEW OF SYSTEMS
[Abdominal Pain] : abdominal pain [Nausea] : nausea [Anxiety] : anxiety [Easy Bruising] : easy bruising [Negative] : Musculoskeletal [Fever] : no fever [Chills] : no chills [Fatigue] : no fatigue [Recent Change In Weight] : ~T no recent weight change [Chest Pain] : no chest pain [Palpitations] : no palpitations [Shortness Of Breath] : no shortness of breath [Cough] : no cough [Dyspnea on Exertion] : no dyspnea on exertion [Constipation] : no constipation [Diarrhea] : diarrhea [Vomiting] : no vomiting [Heartburn] : no heartburn [Melena] : no melena [Dysuria] : no dysuria [Vaginal Discharge] : no vaginal discharge [Skin Rash] : no skin rash [Headache] : no headache [Dizziness] : no dizziness [Fainting] : no fainting [Depression] : no depression [Easy Bleeding] : no easy bleeding [de-identified] : chronic easy bruising

## 2024-07-30 ENCOUNTER — APPOINTMENT (OUTPATIENT)
Dept: GASTROENTEROLOGY | Facility: CLINIC | Age: 44
End: 2024-07-30

## 2024-07-31 ENCOUNTER — APPOINTMENT (OUTPATIENT)
Dept: GASTROENTEROLOGY | Facility: CLINIC | Age: 44
End: 2024-07-31
Payer: COMMERCIAL

## 2024-07-31 VITALS
HEART RATE: 73 BPM | DIASTOLIC BLOOD PRESSURE: 66 MMHG | BODY MASS INDEX: 20.89 KG/M2 | TEMPERATURE: 98 F | SYSTOLIC BLOOD PRESSURE: 100 MMHG | HEIGHT: 66 IN | OXYGEN SATURATION: 100 % | WEIGHT: 130 LBS | RESPIRATION RATE: 16 BRPM

## 2024-07-31 DIAGNOSIS — N83.202 UNSPECIFIED OVARIAN CYST, LEFT SIDE: ICD-10-CM

## 2024-07-31 DIAGNOSIS — Z12.11 ENCOUNTER FOR SCREENING FOR MALIGNANT NEOPLASM OF COLON: ICD-10-CM

## 2024-07-31 DIAGNOSIS — R10.32 LEFT LOWER QUADRANT PAIN: ICD-10-CM

## 2024-07-31 DIAGNOSIS — Z83.719 FAMILY HISTORY OF COLON POLYPS, UNSPECIFIED: ICD-10-CM

## 2024-07-31 DIAGNOSIS — Z80.0 FAMILY HISTORY OF MALIGNANT NEOPLASM OF DIGESTIVE ORGANS: ICD-10-CM

## 2024-07-31 DIAGNOSIS — D18.03 HEMANGIOMA OF INTRA-ABDOMINAL STRUCTURES: ICD-10-CM

## 2024-07-31 PROCEDURE — 99204 OFFICE O/P NEW MOD 45 MIN: CPT

## 2024-07-31 RX ORDER — SODIUM SULFATE, POTASSIUM SULFATE AND MAGNESIUM SULFATE 1.6; 3.13; 17.5 G/177ML; G/177ML; G/177ML
17.5-3.13-1.6 SOLUTION ORAL
Qty: 2 | Refills: 0 | Status: ACTIVE | COMMUNITY
Start: 2024-07-31 | End: 1900-01-01

## 2024-07-31 NOTE — REASON FOR VISIT
[Initial Evaluation] : an initial evaluation [FreeTextEntry1] : LLQ Abdominal Pain, CT Abdomen and Pelvis Results

## 2024-07-31 NOTE — PHYSICAL EXAM

## 2024-07-31 NOTE — ASSESSMENT
[FreeTextEntry1] : Pt is aware that she needs to f/u with Dr. Culp to schedule MRI abdomen. Instructed pt to f/u with OBGYN for further evaluation/management of Lt Ovarian Cyst. Referred pt for diagnostic colonocsopy. Pt expressed understanding and agrees with the plan.  A colonoscopy is an exam of the lower part of the gastrointestinal tract, which is called the colon or large intestine (bowel). Colonoscopy is a safe procedure that provides information that other tests may not be able to give.  Colonoscopy is performed by inserting a device called a colonoscope into the anus and advancing through the entire colon. The procedure generally takes between 20 minutes and one hour.  Other tests that are sometimes used to screen for colon cancer, like virtual colonoscopy (also called CT colonography), were discussed separately.  REASONS FOR COLONOSCOPY:  The most common reasons for colonoscopy are:  1. To screen for colon polyps (growths of tissue in the colon) or colon cancer  2. Rectal bleeding  3. A change in bowel habits, like persistent diarrhea  4. Iron deficiency anemia (a decrease in blood count due to loss of iron)  5. A family history of colon cancer  6. A personal history of colon polyps or colon cancer  7. Chronic, unexplained abdominal or rectal pain  8. An abnormal X-Ray exam, like a barium enema or CT scan.   COLONOSCOPY PREPARATION: Before colonoscopy, your colon must be completely cleaned out so that the doctor can see any abnormal areas. This is vitally important to increase the chances that your doctor will identify abnormalities in your colon. If your colon is not completely cleaned out, the chances your doctor will miss abnormalities increases. Your doctor's office will provide specific instructions about how you should prepare for your colonoscopy. Be sure to read these instructions as soon as you get them so you will know how to take the preparation and whether you need to make any changes to your medications or diet. If you have questions, call the doctor's office in advance.  I spent 45 minutes with the patient as well as reviewing documents prior to and after the office visit. Patient verbalized understanding of all information provided. All questions answered and reviewed.  Robert Brunner, MD

## 2024-07-31 NOTE — HISTORY OF PRESENT ILLNESS
[FreeTextEntry1] : Thank you for consult.  Patient is a 45 y/o female with a PMHx of Lt Ovarian Cyst and Hepatic Hemangioma who presents c/o LLQ abdominal pain several months ago, which resolved on its own. FHx is significant for colon cancer in the paternal uncle and colon polyps in the mother and maternal grandfather. CT Abdomen and Pelvis performed on 2/9/2024, ordered by pt's PCP, Dr. Culp, showed "There is a 5.9 cm mass with peripheral nodular enhancement identified within the right hepatic lobe, within appearance most suggestive for a hepatic parenchymal hemangioma. Additional right hepatic hypodense foci are identified measuring 1.3 cm. Further characterization can be performed with contrast-enhanced abdominal MRI. Left ovarian cystic foci identified measuring up to 2.5 cm. Heterogeneous attenuation of the uterine myometrium may represent underlying myomatous change. Trace free pelvic fluid.". Pt states that she has not yet f/u with Dr. Culp to schedule the MRI. 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 Pt has never had an EGD, but states she had a "partial Colonoscopy with MiraLAX Prep" several years ago for rectal bleeding.

## 2024-08-27 ENCOUNTER — APPOINTMENT (OUTPATIENT)
Dept: GASTROENTEROLOGY | Facility: AMBULATORY MEDICAL SERVICES | Age: 44
End: 2024-08-27
Payer: COMMERCIAL

## 2024-08-27 PROCEDURE — 45378 DIAGNOSTIC COLONOSCOPY: CPT | Mod: 33

## 2024-09-06 DIAGNOSIS — R93.2 ABNORMAL FINDINGS ON DIAGNOSTIC IMAGING OF LIVER AND BILIARY TRACT: ICD-10-CM

## 2024-10-11 ENCOUNTER — APPOINTMENT (OUTPATIENT)
Dept: MRI IMAGING | Facility: IMAGING CENTER | Age: 44
End: 2024-10-11

## 2024-10-11 ENCOUNTER — OUTPATIENT (OUTPATIENT)
Dept: OUTPATIENT SERVICES | Facility: HOSPITAL | Age: 44
LOS: 1 days | End: 2024-10-11
Payer: COMMERCIAL

## 2024-10-11 DIAGNOSIS — Z00.8 ENCOUNTER FOR OTHER GENERAL EXAMINATION: ICD-10-CM

## 2024-10-11 DIAGNOSIS — R93.2 ABNORMAL FINDINGS ON DIAGNOSTIC IMAGING OF LIVER AND BILIARY TRACT: ICD-10-CM

## 2024-10-11 PROCEDURE — 74183 MRI ABD W/O CNTR FLWD CNTR: CPT

## 2024-10-11 PROCEDURE — A9585: CPT

## 2024-10-11 PROCEDURE — 74183 MRI ABD W/O CNTR FLWD CNTR: CPT | Mod: 26

## 2024-10-17 ENCOUNTER — TRANSCRIPTION ENCOUNTER (OUTPATIENT)
Age: 44
End: 2024-10-17

## 2025-02-07 ENCOUNTER — APPOINTMENT (OUTPATIENT)
Dept: INTERNAL MEDICINE | Facility: CLINIC | Age: 45
End: 2025-02-07
Payer: COMMERCIAL

## 2025-02-07 ENCOUNTER — OUTPATIENT (OUTPATIENT)
Dept: OUTPATIENT SERVICES | Facility: HOSPITAL | Age: 45
LOS: 1 days | End: 2025-02-07
Payer: COMMERCIAL

## 2025-02-07 VITALS
HEIGHT: 66 IN | DIASTOLIC BLOOD PRESSURE: 60 MMHG | BODY MASS INDEX: 20.89 KG/M2 | HEART RATE: 85 BPM | OXYGEN SATURATION: 98 % | SYSTOLIC BLOOD PRESSURE: 108 MMHG | WEIGHT: 130 LBS

## 2025-02-07 DIAGNOSIS — Z87.898 PERSONAL HISTORY OF OTHER SPECIFIED CONDITIONS: ICD-10-CM

## 2025-02-07 DIAGNOSIS — Z12.11 ENCOUNTER FOR SCREENING FOR MALIGNANT NEOPLASM OF COLON: ICD-10-CM

## 2025-02-07 DIAGNOSIS — I10 ESSENTIAL (PRIMARY) HYPERTENSION: ICD-10-CM

## 2025-02-07 DIAGNOSIS — Z00.00 ENCOUNTER FOR GENERAL ADULT MEDICAL EXAMINATION W/OUT ABNORMAL FINDINGS: ICD-10-CM

## 2025-02-07 DIAGNOSIS — L65.9 NONSCARRING HAIR LOSS, UNSPECIFIED: ICD-10-CM

## 2025-02-07 DIAGNOSIS — R10.32 LEFT LOWER QUADRANT PAIN: ICD-10-CM

## 2025-02-07 DIAGNOSIS — R93.2 ABNORMAL FINDINGS ON DIAGNOSTIC IMAGING OF LIVER AND BILIARY TRACT: ICD-10-CM

## 2025-02-07 PROCEDURE — G0463: CPT

## 2025-02-07 PROCEDURE — 99396 PREV VISIT EST AGE 40-64: CPT

## 2025-02-07 RX ORDER — MAGNESIUM OXIDE/MAG AA CHELATE 300 MG
CAPSULE ORAL
Refills: 0 | Status: ACTIVE | COMMUNITY

## 2025-02-09 LAB
ALBUMIN SERPL ELPH-MCNC: 4.5 G/DL
ALP BLD-CCNC: 43 U/L
ALT SERPL-CCNC: 13 U/L
ANION GAP SERPL CALC-SCNC: 12 MMOL/L
AST SERPL-CCNC: 16 U/L
BILIRUB SERPL-MCNC: 0.6 MG/DL
BUN SERPL-MCNC: 24 MG/DL
CALCIUM SERPL-MCNC: 9.5 MG/DL
CHLORIDE SERPL-SCNC: 103 MMOL/L
CO2 SERPL-SCNC: 26 MMOL/L
CREAT SERPL-MCNC: 0.72 MG/DL
EGFR: 107 ML/MIN/1.73M2
GLUCOSE SERPL-MCNC: 71 MG/DL
HCT VFR BLD CALC: 36.1 %
HCV AB SER QL: NONREACTIVE
HCV S/CO RATIO: 0.11 S/CO
HGB BLD-MCNC: 11.6 G/DL
MCHC RBC-ENTMCNC: 28.3 PG
MCHC RBC-ENTMCNC: 32.1 G/DL
MCV RBC AUTO: 88 FL
PLATELET # BLD AUTO: 219 K/UL
POTASSIUM SERPL-SCNC: 4.5 MMOL/L
PROT SERPL-MCNC: 7.4 G/DL
RBC # BLD: 4.1 M/UL
RBC # FLD: 13.6 %
SODIUM SERPL-SCNC: 141 MMOL/L
TSH SERPL-ACNC: 2.08 UIU/ML
WBC # FLD AUTO: 4.71 K/UL

## 2025-02-18 DIAGNOSIS — Z00.00 ENCOUNTER FOR GENERAL ADULT MEDICAL EXAMINATION WITHOUT ABNORMAL FINDINGS: ICD-10-CM

## 2025-05-29 ENCOUNTER — APPOINTMENT (OUTPATIENT)
Dept: INTERNAL MEDICINE | Facility: CLINIC | Age: 45
End: 2025-05-29
Payer: COMMERCIAL

## 2025-05-29 ENCOUNTER — OUTPATIENT (OUTPATIENT)
Dept: OUTPATIENT SERVICES | Facility: HOSPITAL | Age: 45
LOS: 1 days | End: 2025-05-29

## 2025-05-29 ENCOUNTER — TRANSCRIPTION ENCOUNTER (OUTPATIENT)
Age: 45
End: 2025-05-29

## 2025-05-29 DIAGNOSIS — H00.011 HORDEOLUM EXTERNUM RIGHT UPPER EYELID: ICD-10-CM

## 2025-05-29 PROCEDURE — 99213 OFFICE O/P EST LOW 20 MIN: CPT | Mod: 95

## 2025-06-03 ENCOUNTER — NON-APPOINTMENT (OUTPATIENT)
Age: 45
End: 2025-06-03

## 2025-06-03 ENCOUNTER — APPOINTMENT (OUTPATIENT)
Dept: OPHTHALMOLOGY | Facility: CLINIC | Age: 45
End: 2025-06-03
Payer: COMMERCIAL

## 2025-06-03 PROCEDURE — 92285 EXTERNAL OCULAR PHOTOGRAPHY: CPT

## 2025-06-03 PROCEDURE — 99203 OFFICE O/P NEW LOW 30 MIN: CPT

## 2025-06-12 DIAGNOSIS — I10 ESSENTIAL (PRIMARY) HYPERTENSION: ICD-10-CM

## 2025-07-28 ENCOUNTER — APPOINTMENT (OUTPATIENT)
Dept: OPHTHALMOLOGY | Facility: CLINIC | Age: 45
End: 2025-07-28